# Patient Record
Sex: FEMALE | Race: WHITE | URBAN - METROPOLITAN AREA
[De-identification: names, ages, dates, MRNs, and addresses within clinical notes are randomized per-mention and may not be internally consistent; named-entity substitution may affect disease eponyms.]

---

## 2018-06-13 VITALS
DIASTOLIC BLOOD PRESSURE: 79 MMHG | WEIGHT: 117.95 LBS | TEMPERATURE: 97 F | SYSTOLIC BLOOD PRESSURE: 180 MMHG | OXYGEN SATURATION: 95 % | HEART RATE: 78 BPM | HEIGHT: 65 IN | RESPIRATION RATE: 16 BRPM

## 2018-06-13 NOTE — PATIENT PROFILE ADULT. - PSH
Elbow fracture  left  H/O laminectomy    History of appendectomy    History of lumbar fusion  and thoracic  History of lumbar fusion  failed  History of total right knee replacement  '13  Volvulus

## 2018-06-13 NOTE — PATIENT PROFILE ADULT. - PMH
Basal cell carcinoma    Colon obstruction Basal cell carcinoma    Cardiomyopathy    Colon obstruction    COPD (chronic obstructive pulmonary disease)    DVT (deep venous thrombosis) Basal cell carcinoma    Cardiomyopathy    Colon obstruction    COPD (chronic obstructive pulmonary disease)    DVT (deep venous thrombosis)  3 years ago, off xarelto

## 2018-06-14 ENCOUNTER — INPATIENT (INPATIENT)
Facility: HOSPITAL | Age: 73
LOS: 3 days | Discharge: HOME | DRG: 336 | End: 2018-06-18
Attending: SURGERY | Admitting: SURGERY
Payer: MEDICARE

## 2018-06-14 DIAGNOSIS — Z96.651 PRESENCE OF RIGHT ARTIFICIAL KNEE JOINT: Chronic | ICD-10-CM

## 2018-06-14 DIAGNOSIS — S42.409A UNSPECIFIED FRACTURE OF LOWER END OF UNSPECIFIED HUMERUS, INITIAL ENCOUNTER FOR CLOSED FRACTURE: Chronic | ICD-10-CM

## 2018-06-14 DIAGNOSIS — K56.609 UNSPECIFIED INTESTINAL OBSTRUCTION, UNSPECIFIED AS TO PARTIAL VERSUS COMPLETE OBSTRUCTION: ICD-10-CM

## 2018-06-14 DIAGNOSIS — K56.2 VOLVULUS: Chronic | ICD-10-CM

## 2018-06-14 DIAGNOSIS — Z98.1 ARTHRODESIS STATUS: Chronic | ICD-10-CM

## 2018-06-14 DIAGNOSIS — Z90.49 ACQUIRED ABSENCE OF OTHER SPECIFIED PARTS OF DIGESTIVE TRACT: Chronic | ICD-10-CM

## 2018-06-14 DIAGNOSIS — Z98.890 OTHER SPECIFIED POSTPROCEDURAL STATES: Chronic | ICD-10-CM

## 2018-06-14 LAB
APTT BLD: 25 SEC — LOW (ref 27.5–37.4)
BASOPHILS NFR BLD AUTO: 0.4 % — SIGNIFICANT CHANGE UP (ref 0–2)
EOSINOPHIL NFR BLD AUTO: 2.2 % — SIGNIFICANT CHANGE UP (ref 0–6)
GLUCOSE BLDC GLUCOMTR-MCNC: 124 MG/DL — HIGH (ref 70–99)
HCT VFR BLD CALC: 40.8 % — SIGNIFICANT CHANGE UP (ref 34.5–45)
HGB BLD-MCNC: 13.6 G/DL — SIGNIFICANT CHANGE UP (ref 11.5–15.5)
INR BLD: 0.96 — SIGNIFICANT CHANGE UP (ref 0.88–1.16)
LYMPHOCYTES # BLD AUTO: 21 % — SIGNIFICANT CHANGE UP (ref 13–44)
MCHC RBC-ENTMCNC: 33.3 G/DL — SIGNIFICANT CHANGE UP (ref 32–36)
MCHC RBC-ENTMCNC: 33.9 PG — SIGNIFICANT CHANGE UP (ref 27–34)
MCV RBC AUTO: 101.7 FL — HIGH (ref 80–100)
MONOCYTES NFR BLD AUTO: 16 % — HIGH (ref 2–14)
NEUTROPHILS NFR BLD AUTO: 60.4 % — SIGNIFICANT CHANGE UP (ref 43–77)
PLATELET # BLD AUTO: 136 K/UL — LOW (ref 150–400)
PROTHROM AB SERPL-ACNC: 10.6 SEC — SIGNIFICANT CHANGE UP (ref 9.8–12.7)
RBC # BLD: 4.01 M/UL — SIGNIFICANT CHANGE UP (ref 3.8–5.2)
RBC # FLD: 12.7 % — SIGNIFICANT CHANGE UP (ref 10.3–16.9)
WBC # BLD: 5.4 K/UL — SIGNIFICANT CHANGE UP (ref 3.8–10.5)
WBC # FLD AUTO: 5.4 K/UL — SIGNIFICANT CHANGE UP (ref 3.8–10.5)

## 2018-06-14 RX ORDER — ONDANSETRON 8 MG/1
4 TABLET, FILM COATED ORAL EVERY 6 HOURS
Qty: 0 | Refills: 0 | Status: DISCONTINUED | OUTPATIENT
Start: 2018-06-14 | End: 2018-06-18

## 2018-06-14 RX ORDER — KETOROLAC TROMETHAMINE 0.5 %
1 DROPS OPHTHALMIC (EYE)
Qty: 0 | Refills: 0 | Status: DISCONTINUED | OUTPATIENT
Start: 2018-06-14 | End: 2018-06-14

## 2018-06-14 RX ORDER — HEPARIN SODIUM 5000 [USP'U]/ML
5000 INJECTION INTRAVENOUS; SUBCUTANEOUS EVERY 8 HOURS
Qty: 0 | Refills: 0 | Status: DISCONTINUED | OUTPATIENT
Start: 2018-06-14 | End: 2018-06-16

## 2018-06-14 RX ORDER — HYDROMORPHONE HYDROCHLORIDE 2 MG/ML
0.5 INJECTION INTRAMUSCULAR; INTRAVENOUS; SUBCUTANEOUS EVERY 4 HOURS
Qty: 0 | Refills: 0 | Status: DISCONTINUED | OUTPATIENT
Start: 2018-06-14 | End: 2018-06-15

## 2018-06-14 RX ORDER — SODIUM CHLORIDE 9 MG/ML
1000 INJECTION, SOLUTION INTRAVENOUS
Qty: 0 | Refills: 0 | Status: DISCONTINUED | OUTPATIENT
Start: 2018-06-14 | End: 2018-06-15

## 2018-06-14 RX ORDER — IPRATROPIUM/ALBUTEROL SULFATE 18-103MCG
3 AEROSOL WITH ADAPTER (GRAM) INHALATION EVERY 6 HOURS
Qty: 0 | Refills: 0 | Status: DISCONTINUED | OUTPATIENT
Start: 2018-06-14 | End: 2018-06-18

## 2018-06-14 RX ORDER — ERYTHROMYCIN BASE 5 MG/GRAM
1 OINTMENT (GRAM) OPHTHALMIC (EYE)
Qty: 0 | Refills: 0 | Status: DISCONTINUED | OUTPATIENT
Start: 2018-06-14 | End: 2018-06-18

## 2018-06-14 RX ORDER — HYDROMORPHONE HYDROCHLORIDE 2 MG/ML
1 INJECTION INTRAMUSCULAR; INTRAVENOUS; SUBCUTANEOUS EVERY 4 HOURS
Qty: 0 | Refills: 0 | Status: DISCONTINUED | OUTPATIENT
Start: 2018-06-14 | End: 2018-06-15

## 2018-06-14 RX ORDER — BUPIVACAINE 13.3 MG/ML
20 INJECTION, SUSPENSION, LIPOSOMAL INFILTRATION ONCE
Qty: 0 | Refills: 0 | Status: DISCONTINUED | OUTPATIENT
Start: 2018-06-14 | End: 2018-06-18

## 2018-06-14 RX ADMIN — HYDROMORPHONE HYDROCHLORIDE 0.5 MILLIGRAM(S): 2 INJECTION INTRAMUSCULAR; INTRAVENOUS; SUBCUTANEOUS at 15:21

## 2018-06-14 RX ADMIN — HYDROMORPHONE HYDROCHLORIDE 1 MILLIGRAM(S): 2 INJECTION INTRAMUSCULAR; INTRAVENOUS; SUBCUTANEOUS at 22:03

## 2018-06-14 RX ADMIN — HYDROMORPHONE HYDROCHLORIDE 1 MILLIGRAM(S): 2 INJECTION INTRAMUSCULAR; INTRAVENOUS; SUBCUTANEOUS at 22:15

## 2018-06-14 RX ADMIN — Medication 1 DROP(S): at 15:18

## 2018-06-14 RX ADMIN — Medication 1 DROP(S): at 14:13

## 2018-06-14 RX ADMIN — HYDROMORPHONE HYDROCHLORIDE 0.5 MILLIGRAM(S): 2 INJECTION INTRAMUSCULAR; INTRAVENOUS; SUBCUTANEOUS at 15:22

## 2018-06-14 RX ADMIN — Medication 10 MILLIGRAM(S): at 22:00

## 2018-06-14 RX ADMIN — Medication 1 DROP(S): at 19:42

## 2018-06-14 RX ADMIN — HYDROMORPHONE HYDROCHLORIDE 1 MILLIGRAM(S): 2 INJECTION INTRAMUSCULAR; INTRAVENOUS; SUBCUTANEOUS at 15:42

## 2018-06-14 RX ADMIN — Medication 3 MILLILITER(S): at 17:07

## 2018-06-14 RX ADMIN — HEPARIN SODIUM 5000 UNIT(S): 5000 INJECTION INTRAVENOUS; SUBCUTANEOUS at 22:02

## 2018-06-14 RX ADMIN — Medication 1 APPLICATION(S): at 22:03

## 2018-06-14 RX ADMIN — HYDROMORPHONE HYDROCHLORIDE 1 MILLIGRAM(S): 2 INJECTION INTRAMUSCULAR; INTRAVENOUS; SUBCUTANEOUS at 15:29

## 2018-06-14 NOTE — PROGRESS NOTE ADULT - SUBJECTIVE AND OBJECTIVE BOX
Team 1 Surgery Post-Op Note, PCN:     Procedure: Laparoscopic lysis of adhesions    Surgeon: Aquilino    Subjective: Patient examined postoperatively. Pain adequately controlled. Denies n/v/CP/SOB.      Vital Signs Last 24 Hrs  T(C): 36.9 (14 Jun 2018 13:31), Max: 36.9 (14 Jun 2018 13:31)  T(F): 98.4 (14 Jun 2018 13:31), Max: 98.4 (14 Jun 2018 13:31)  HR: 76 (14 Jun 2018 14:01) (76 - 82)  BP: 109/55 (14 Jun 2018 14:01) (109/55 - 180/79)  BP(mean): 86 (14 Jun 2018 14:01) (86 - 105)  RR: 14 (14 Jun 2018 14:01) (14 - 18)  SpO2: 98% (14 Jun 2018 14:01) (95% - 100%)    Physical Exam:  General: NAD, resting comfortably in bed  Pulmonary: Nonlabored breathing, no respiratory distress  Abdominal: softly distended, appropriately tender, prior well-healed incisions, incisions clean/dry/intact      LABS:                        13.6   5.4   )-----------( 136      ( 14 Jun 2018 08:13 )             40.8           PT/INR - ( 14 Jun 2018 08:14 )   PT: 10.6 sec;   INR: 0.96          PTT - ( 14 Jun 2018 08:14 )  PTT:25.0 sec  CAPILLARY BLOOD GLUCOSE      POCT Blood Glucose.: 124 mg/dL (14 Jun 2018 07:58)        ABO Interpretation: O (06-14 @ 10:31)        Radiology and Additional Studies:    Assessment: 72y Female s/p laparoscopy with lysis of adhesions    Plan:  Pain/nausea control PRN  NGT, NPO, IVF  AM labs  Bartlett to be dc'd at midnight  WA protocol  Opthalmology consult

## 2018-06-14 NOTE — H&P ADULT - HISTORY OF PRESENT ILLNESS
72F with extensive surgical history, here with chronic diarrhea, abdominal pain, and abdominal distension, found on CT scan to have collapsed descending colon with dilated proximal large bowel. As a result, pt came for diagnostic laparoscopy, lysis of adhesions, and mobilization of splenic flexure. Of note, pt had a recent colonoscopy on 3/20/2018 that was unrevealing. Pt reports that she has diarrhea daily. NO f/c/n/v.

## 2018-06-14 NOTE — H&P ADULT - NSHPPHYSICALEXAM_GEN_ALL_CORE
Gen: NAD  CV: RRR  Pulm: Regular nonlabored respirations  Abd: Softly distended, mild tenderness in upper abdomen

## 2018-06-14 NOTE — BRIEF OPERATIVE NOTE - OPERATION/FINDINGS
Preop Dx: Chronic, partial colonic obstruction  Postop Dx: Same as above  Procedure: Laparoscopic lysis of adhesions and mobilization of splenic flexure  Findings: Abdomen entered in LUQ under direct vision. Additional ports placed. Adhesions lysed. Falciform ligament taken down to place further trocars. Diagnostic laparoscopy noted for dilated transverse colon and splenic flexure with collapsed descending colon. White line of [place] mobilized from mid descending colon up to the splenic flexure. Retrosplenic adhesions lysed. Ultimately, descending colon filled with material and began to dilate. Hemostasis obtained. NGT placed under direct vision to decompress the stomach.

## 2018-06-14 NOTE — H&P ADULT - PMH
Basal cell carcinoma    Cardiomyopathy    Colon obstruction    COPD (chronic obstructive pulmonary disease)    DVT (deep venous thrombosis)  3 years ago, off xarelto

## 2018-06-14 NOTE — BRIEF OPERATIVE NOTE - PROCEDURE
<<-----Click on this checkbox to enter Procedure Laparoscopic lysis of adhesions  06/14/2018  with mobilization of splenic flexure  Active  [id number]

## 2018-06-14 NOTE — H&P ADULT - PROBLEM SELECTOR PLAN 1
-Admit to Dr. Rolle - Surgery Team 4 - Telemetry  -NPO/NGT/IVF  -Pain/nausea control  -CIWA protocol as pt has extensive EtOH history  -SQH  -D/W Dr. Rolle

## 2018-06-14 NOTE — H&P ADULT - ASSESSMENT
72F with extensive surgical history, found to have likely chronic partial obstruction of her colon at the splenic flexure s/p diagnostic laparoscopy, lysis of adhesions, and mobilization of the splenic flexure

## 2018-06-15 LAB
ANION GAP SERPL CALC-SCNC: 12 MMOL/L — SIGNIFICANT CHANGE UP (ref 5–17)
BUN SERPL-MCNC: 3 MG/DL — LOW (ref 7–23)
CALCIUM SERPL-MCNC: 8.4 MG/DL — SIGNIFICANT CHANGE UP (ref 8.4–10.5)
CHLORIDE SERPL-SCNC: 101 MMOL/L — SIGNIFICANT CHANGE UP (ref 96–108)
CO2 SERPL-SCNC: 26 MMOL/L — SIGNIFICANT CHANGE UP (ref 22–31)
CREAT SERPL-MCNC: 0.6 MG/DL — SIGNIFICANT CHANGE UP (ref 0.5–1.3)
GLUCOSE SERPL-MCNC: 139 MG/DL — HIGH (ref 70–99)
HCT VFR BLD CALC: 35.5 % — SIGNIFICANT CHANGE UP (ref 34.5–45)
HGB BLD-MCNC: 11.8 G/DL — SIGNIFICANT CHANGE UP (ref 11.5–15.5)
MAGNESIUM SERPL-MCNC: 1.8 MG/DL — SIGNIFICANT CHANGE UP (ref 1.6–2.6)
MCHC RBC-ENTMCNC: 33.2 G/DL — SIGNIFICANT CHANGE UP (ref 32–36)
MCHC RBC-ENTMCNC: 34 PG — SIGNIFICANT CHANGE UP (ref 27–34)
MCV RBC AUTO: 102.3 FL — HIGH (ref 80–100)
PHOSPHATE SERPL-MCNC: 2.7 MG/DL — SIGNIFICANT CHANGE UP (ref 2.5–4.5)
PLATELET # BLD AUTO: 117 K/UL — LOW (ref 150–400)
POTASSIUM SERPL-MCNC: 4.8 MMOL/L — SIGNIFICANT CHANGE UP (ref 3.5–5.3)
POTASSIUM SERPL-SCNC: 4.8 MMOL/L — SIGNIFICANT CHANGE UP (ref 3.5–5.3)
RBC # BLD: 3.47 M/UL — LOW (ref 3.8–5.2)
RBC # FLD: 12.5 % — SIGNIFICANT CHANGE UP (ref 10.3–16.9)
SODIUM SERPL-SCNC: 139 MMOL/L — SIGNIFICANT CHANGE UP (ref 135–145)
WBC # BLD: 7.1 K/UL — SIGNIFICANT CHANGE UP (ref 3.8–10.5)
WBC # FLD AUTO: 7.1 K/UL — SIGNIFICANT CHANGE UP (ref 3.8–10.5)

## 2018-06-15 PROCEDURE — 74019 RADEX ABDOMEN 2 VIEWS: CPT | Mod: 26

## 2018-06-15 RX ORDER — OXYCODONE HYDROCHLORIDE 5 MG/1
5 TABLET ORAL EVERY 4 HOURS
Qty: 0 | Refills: 0 | Status: DISCONTINUED | OUTPATIENT
Start: 2018-06-15 | End: 2018-06-18

## 2018-06-15 RX ORDER — FOLIC ACID 0.8 MG
1 TABLET ORAL DAILY
Qty: 0 | Refills: 0 | Status: DISCONTINUED | OUTPATIENT
Start: 2018-06-15 | End: 2018-06-18

## 2018-06-15 RX ORDER — UMECLIDINIUM BROMIDE AND VILANTEROL TRIFENATATE 62.5; 25 UG/1; UG/1
2 POWDER RESPIRATORY (INHALATION) DAILY
Qty: 0 | Refills: 0 | Status: DISCONTINUED | OUTPATIENT
Start: 2018-06-15 | End: 2018-06-18

## 2018-06-15 RX ORDER — OXYCODONE HYDROCHLORIDE 5 MG/1
10 TABLET ORAL EVERY 4 HOURS
Qty: 0 | Refills: 0 | Status: DISCONTINUED | OUTPATIENT
Start: 2018-06-15 | End: 2018-06-18

## 2018-06-15 RX ORDER — MAGNESIUM SULFATE 500 MG/ML
1 VIAL (ML) INJECTION ONCE
Qty: 0 | Refills: 0 | Status: COMPLETED | OUTPATIENT
Start: 2018-06-15 | End: 2018-06-15

## 2018-06-15 RX ORDER — PANTOPRAZOLE SODIUM 20 MG/1
40 TABLET, DELAYED RELEASE ORAL
Qty: 0 | Refills: 0 | Status: DISCONTINUED | OUTPATIENT
Start: 2018-06-15 | End: 2018-06-18

## 2018-06-15 RX ORDER — BACLOFEN 100 %
10 POWDER (GRAM) MISCELLANEOUS AT BEDTIME
Qty: 0 | Refills: 0 | Status: DISCONTINUED | OUTPATIENT
Start: 2018-06-15 | End: 2018-06-18

## 2018-06-15 RX ORDER — MULTIVIT-MIN/FERROUS GLUCONATE 9 MG/15 ML
1 LIQUID (ML) ORAL DAILY
Qty: 0 | Refills: 0 | Status: DISCONTINUED | OUTPATIENT
Start: 2018-06-15 | End: 2018-06-18

## 2018-06-15 RX ORDER — ASPIRIN/CALCIUM CARB/MAGNESIUM 324 MG
81 TABLET ORAL DAILY
Qty: 0 | Refills: 0 | Status: DISCONTINUED | OUTPATIENT
Start: 2018-06-15 | End: 2018-06-18

## 2018-06-15 RX ORDER — SODIUM CHLORIDE 9 MG/ML
1000 INJECTION, SOLUTION INTRAVENOUS
Qty: 0 | Refills: 0 | Status: DISCONTINUED | OUTPATIENT
Start: 2018-06-15 | End: 2018-06-15

## 2018-06-15 RX ORDER — HYDROMORPHONE HYDROCHLORIDE 2 MG/ML
0.5 INJECTION INTRAMUSCULAR; INTRAVENOUS; SUBCUTANEOUS ONCE
Qty: 0 | Refills: 0 | Status: DISCONTINUED | OUTPATIENT
Start: 2018-06-15 | End: 2018-06-15

## 2018-06-15 RX ORDER — THIAMINE MONONITRATE (VIT B1) 100 MG
100 TABLET ORAL DAILY
Qty: 0 | Refills: 0 | Status: DISCONTINUED | OUTPATIENT
Start: 2018-06-15 | End: 2018-06-18

## 2018-06-15 RX ORDER — CARVEDILOL PHOSPHATE 80 MG/1
3.12 CAPSULE, EXTENDED RELEASE ORAL EVERY 12 HOURS
Qty: 0 | Refills: 0 | Status: DISCONTINUED | OUTPATIENT
Start: 2018-06-15 | End: 2018-06-18

## 2018-06-15 RX ORDER — ATORVASTATIN CALCIUM 80 MG/1
10 TABLET, FILM COATED ORAL AT BEDTIME
Qty: 0 | Refills: 0 | Status: DISCONTINUED | OUTPATIENT
Start: 2018-06-15 | End: 2018-06-18

## 2018-06-15 RX ORDER — METOPROLOL TARTRATE 50 MG
10 TABLET ORAL ONCE
Qty: 0 | Refills: 0 | Status: COMPLETED | OUTPATIENT
Start: 2018-06-15 | End: 2018-06-15

## 2018-06-15 RX ADMIN — Medication 1 APPLICATION(S): at 11:14

## 2018-06-15 RX ADMIN — PANTOPRAZOLE SODIUM 40 MILLIGRAM(S): 20 TABLET, DELAYED RELEASE ORAL at 11:06

## 2018-06-15 RX ADMIN — HEPARIN SODIUM 5000 UNIT(S): 5000 INJECTION INTRAVENOUS; SUBCUTANEOUS at 06:10

## 2018-06-15 RX ADMIN — HYDROMORPHONE HYDROCHLORIDE 1 MILLIGRAM(S): 2 INJECTION INTRAMUSCULAR; INTRAVENOUS; SUBCUTANEOUS at 02:30

## 2018-06-15 RX ADMIN — HYDROMORPHONE HYDROCHLORIDE 1 MILLIGRAM(S): 2 INJECTION INTRAMUSCULAR; INTRAVENOUS; SUBCUTANEOUS at 02:03

## 2018-06-15 RX ADMIN — HYDROMORPHONE HYDROCHLORIDE 1 MILLIGRAM(S): 2 INJECTION INTRAMUSCULAR; INTRAVENOUS; SUBCUTANEOUS at 11:22

## 2018-06-15 RX ADMIN — Medication 81 MILLIGRAM(S): at 11:06

## 2018-06-15 RX ADMIN — Medication 10 MILLIGRAM(S): at 22:44

## 2018-06-15 RX ADMIN — Medication 10 MILLIGRAM(S): at 23:55

## 2018-06-15 RX ADMIN — Medication 1 APPLICATION(S): at 06:10

## 2018-06-15 RX ADMIN — ATORVASTATIN CALCIUM 10 MILLIGRAM(S): 80 TABLET, FILM COATED ORAL at 22:44

## 2018-06-15 RX ADMIN — Medication 1 MILLIGRAM(S): at 11:06

## 2018-06-15 RX ADMIN — Medication 62.5 MILLIMOLE(S): at 14:35

## 2018-06-15 RX ADMIN — HYDROMORPHONE HYDROCHLORIDE 1 MILLIGRAM(S): 2 INJECTION INTRAMUSCULAR; INTRAVENOUS; SUBCUTANEOUS at 06:30

## 2018-06-15 RX ADMIN — Medication 1 APPLICATION(S): at 17:44

## 2018-06-15 RX ADMIN — HYDROMORPHONE HYDROCHLORIDE 1 MILLIGRAM(S): 2 INJECTION INTRAMUSCULAR; INTRAVENOUS; SUBCUTANEOUS at 11:06

## 2018-06-15 RX ADMIN — HYDROMORPHONE HYDROCHLORIDE 1 MILLIGRAM(S): 2 INJECTION INTRAMUSCULAR; INTRAVENOUS; SUBCUTANEOUS at 06:03

## 2018-06-15 RX ADMIN — CARVEDILOL PHOSPHATE 3.12 MILLIGRAM(S): 80 CAPSULE, EXTENDED RELEASE ORAL at 17:42

## 2018-06-15 RX ADMIN — Medication 100 MILLIGRAM(S): at 11:06

## 2018-06-15 RX ADMIN — UMECLIDINIUM BROMIDE AND VILANTEROL TRIFENATATE 2 PUFF(S): 62.5; 25 POWDER RESPIRATORY (INHALATION) at 17:19

## 2018-06-15 RX ADMIN — HEPARIN SODIUM 5000 UNIT(S): 5000 INJECTION INTRAVENOUS; SUBCUTANEOUS at 14:35

## 2018-06-15 RX ADMIN — Medication 100 GRAM(S): at 11:06

## 2018-06-15 NOTE — PROGRESS NOTE ADULT - ASSESSMENT
72F extensive surgical hx with chronic partial obstruction of colon at splenic flexure s/p diagnostic laparoscopy, lysis of adhesions, mobilization of splenic flexure, POD1.    Pain and nausea control with Dilaudid   NPO/IVF  Keep duonebs PRN (for COPD)  Pending trial of void  DVT prophylaxis (SCD and SQH)  AM labs

## 2018-06-15 NOTE — PROGRESS NOTE ADULT - SUBJECTIVE AND OBJECTIVE BOX
STATUS POST:  Diagnostic laparoscopy, lysis of adhesions, mobilization of splenic flexure, POD1     SUBJECTIVE: Patient seen and examined bedside by chief resident. Dr. Middleton (\[facility]\"") erythromycin ointment 4xday for 5 days. Patient self-removed NGT. Bartlett d/c pending TOV, Complain of mild-moderate abdominal pain, no nausea/vomiting but feeling bloated. Requested to eat however no flatus/ BM yet.    heparin  Injectable 5000 Unit(s) SubCutaneous every 8 hours    Vital Signs Last 24 Hrs  T(C): 36.1 (15 Clifton 2018 05:21), Max: 36.9 (14 Jun 2018 13:31)  T(F): 96.9 (15 Clifton 2018 05:21), Max: 98.4 (14 Jun 2018 13:31)  HR: 82 (15 Clifton 2018 05:35) (70 - 86)  BP: 162/77 (15 Clifton 2018 05:35) (106/51 - 163/98)  BP(mean): 110 (15 Clifton 2018 05:35) (72 - 114)  RR: 16 (15 Clifton 2018 05:35) (11 - 25)  SpO2: 97% (15 Clifton 2018 05:35) (96% - 100%)  I&O's Detail    14 Jun 2018 07:01  -  15 Clifton 2018 07:00  --------------------------------------------------------  IN:    lactated ringers.: 300 mL  Total IN: 300 mL    OUT:    Indwelling Catheter - Urethral: 255 mL    Ureteral Catheter: 275 mL  Total OUT: 530 mL    Total NET: -230 mL        General: NAD, resting comfortably in bed  C/V: S1S2 heard, no murmurs  Pulm: Nonlabored breathing, no respiratory distress  Abd: softly distended, tender over the midline but appropriate, incision C/D/I  Extrem: WWP, no edema, SCDs in place        LABS:                        11.8   7.1   )-----------( 117      ( 15 Clifton 2018 06:15 )             35.5     06-15    139  |  101  |  3<L>  ----------------------------<  139<H>  4.8   |  26  |  0.60    Ca    8.4      15 Clifton 2018 06:15  Phos  2.7     06-15  Mg     1.8     06-15      PT/INR - ( 14 Jun 2018 08:14 )   PT: 10.6 sec;   INR: 0.96          PTT - ( 14 Jun 2018 08:14 )  PTT:25.0 sec      RADIOLOGY & ADDITIONAL STUDIES:

## 2018-06-15 NOTE — CHART NOTE - NSCHARTNOTEFT_GEN_A_CORE
Paged at 9:00 PM by nurse regarding patient's behavior towards nursing staff. Patient insists that she be on a "heart monitor" otherwise she will leave AMA. Patient seen and examined bedside. Patient agrees to stay in the hospital if she is moved to a private telemetry bed on the same floor. After long discussion, patient is granted her wish with permission from nursing staff and she is roomed in a telemetry private room on [address].    Soon after, paged by nursing again that patient is threatening to leave AMA. Patient visited again, seen and examined bedside. Discussed and educated patient regarding the importance of being monitored in the hospital, especially since she is only postoperative day 1. Patient continuing to complain that the size of the room is too small. After further discussion, patient conceded to staying overnight.

## 2018-06-15 NOTE — PROGRESS NOTE ADULT - SUBJECTIVE AND OBJECTIVE BOX
Daily     Daily   Vital Signs Last 24 Hrs  T(C): 36.1 (15 Clifton 2018 09:00), Max: 36.9 (14 Jun 2018 13:31)  T(F): 96.9 (15 Clifton 2018 09:00), Max: 98.4 (14 Jun 2018 13:31)  HR: 86 (15 Clifton 2018 08:46) (70 - 86)  BP: 149/84 (15 Clifton 2018 08:46) (106/51 - 163/98)  BP(mean): 108 (15 Clifton 2018 08:46) (72 - 114)  RR: 16 (15 Clifton 2018 08:46) (11 - 25)  SpO2: 97% (15 Clifton 2018 08:46) (96% - 100%)  Allergies    latex (Flushing; Rash)  No Known Drug Allergies    Intolerances      Labs:                        11.8   7.1   )-----------( 117      ( 15 Clifton 2018 06:15 )             35.5     PT/INR - ( 14 Jun 2018 08:14 )   PT: 10.6 sec;   INR: 0.96          PTT - ( 14 Jun 2018 08:14 )  PTT:25.0 sec  06-15    139  |  101  |  3<L>  ----------------------------<  139<H>  4.8   |  26  |  0.60    Ca    8.4      15 Clifton 2018 06:15  Phos  2.7     06-15  Mg     1.8     06-15      I&O's Summary    14 Jun 2018 07:01  -  15 Clifton 2018 07:00  --------------------------------------------------------  IN: 600 mL / OUT: 530 mL / NET: 70 mL    6/15/1HV 20 min 10:50 am  I had a few telephone conversations since OR.  Main complaint was swollen and uncomfortable left eye secondary to mastasol dripping when anaesthesia placed ng tube which was necessary because of extensive gastric distension noted at end of surgery. To day there is no conjunctivitis or swelling and eye is wide open. Rx with saline and neomycin drops rec by tel when an eye consult was requested yesterday.  LUQ pain is consistent with extensive lateral nd posterior dissection including mobilization of splenic flexure and mobilization of spleen to free high splenic flexure kinking in multiple dimensions under the spleen.  Dilaudid for pain. Pat used vicodin for back pain related to extensive spine fixation by AP approach and revision of two previous procedures. Adhesions to transverse abdominal incion was lysed. Cecopexy that was Rx for cecal volvulus was left intact.  Patient is walking and tolerating clear fluids. Colon has not yet decompressed. Gas and of proximal dilated colon were seen filling distal colon which had been collapsed.  No other pathology seen. Liver niormal despite 1 bottle of wine /day. All of above discussed with patient.   Hgb slightly decreased consistent with RL volume expansion. Platelets stable.  DVT prophylaxis-intraop and postop subcut heparin, TEDs , SCD. ASA to restart today. Pt has an IVC filter.  Supplementary effect explained to nurses.  Pt encouraged to walk, take tea and chew gum to stimulate colon function.  Prep meds restrated. Rx discussed with residents.  Overall impression: good progres, stable,  LOS- projected 2-3 days.  MARIA GUADALUPE Rolle MD

## 2018-06-16 LAB
ANION GAP SERPL CALC-SCNC: 11 MMOL/L — SIGNIFICANT CHANGE UP (ref 5–17)
ANION GAP SERPL CALC-SCNC: 14 MMOL/L — SIGNIFICANT CHANGE UP (ref 5–17)
BUN SERPL-MCNC: 2 MG/DL — LOW (ref 7–23)
BUN SERPL-MCNC: 2 MG/DL — LOW (ref 7–23)
CALCIUM SERPL-MCNC: 8.6 MG/DL — SIGNIFICANT CHANGE UP (ref 8.4–10.5)
CALCIUM SERPL-MCNC: 8.9 MG/DL — SIGNIFICANT CHANGE UP (ref 8.4–10.5)
CHLORIDE SERPL-SCNC: 103 MMOL/L — SIGNIFICANT CHANGE UP (ref 96–108)
CHLORIDE SERPL-SCNC: 97 MMOL/L — SIGNIFICANT CHANGE UP (ref 96–108)
CO2 SERPL-SCNC: 25 MMOL/L — SIGNIFICANT CHANGE UP (ref 22–31)
CO2 SERPL-SCNC: 26 MMOL/L — SIGNIFICANT CHANGE UP (ref 22–31)
CREAT SERPL-MCNC: 0.5 MG/DL — SIGNIFICANT CHANGE UP (ref 0.5–1.3)
CREAT SERPL-MCNC: 0.53 MG/DL — SIGNIFICANT CHANGE UP (ref 0.5–1.3)
GLUCOSE SERPL-MCNC: 105 MG/DL — HIGH (ref 70–99)
GLUCOSE SERPL-MCNC: 121 MG/DL — HIGH (ref 70–99)
HCT VFR BLD CALC: 36.5 % — SIGNIFICANT CHANGE UP (ref 34.5–45)
HGB BLD-MCNC: 12.1 G/DL — SIGNIFICANT CHANGE UP (ref 11.5–15.5)
MAGNESIUM SERPL-MCNC: 1.7 MG/DL — SIGNIFICANT CHANGE UP (ref 1.6–2.6)
MCHC RBC-ENTMCNC: 33.2 G/DL — SIGNIFICANT CHANGE UP (ref 32–36)
MCHC RBC-ENTMCNC: 33.9 PG — SIGNIFICANT CHANGE UP (ref 27–34)
MCV RBC AUTO: 102.2 FL — HIGH (ref 80–100)
PHOSPHATE SERPL-MCNC: 3 MG/DL — SIGNIFICANT CHANGE UP (ref 2.5–4.5)
PLATELET # BLD AUTO: 120 K/UL — LOW (ref 150–400)
POTASSIUM SERPL-MCNC: 3.1 MMOL/L — LOW (ref 3.5–5.3)
POTASSIUM SERPL-MCNC: 3.3 MMOL/L — LOW (ref 3.5–5.3)
POTASSIUM SERPL-SCNC: 3.1 MMOL/L — LOW (ref 3.5–5.3)
POTASSIUM SERPL-SCNC: 3.3 MMOL/L — LOW (ref 3.5–5.3)
RBC # BLD: 3.57 M/UL — LOW (ref 3.8–5.2)
RBC # FLD: 12.7 % — SIGNIFICANT CHANGE UP (ref 10.3–16.9)
SODIUM SERPL-SCNC: 137 MMOL/L — SIGNIFICANT CHANGE UP (ref 135–145)
SODIUM SERPL-SCNC: 139 MMOL/L — SIGNIFICANT CHANGE UP (ref 135–145)
WBC # BLD: 6.4 K/UL — SIGNIFICANT CHANGE UP (ref 3.8–10.5)
WBC # FLD AUTO: 6.4 K/UL — SIGNIFICANT CHANGE UP (ref 3.8–10.5)

## 2018-06-16 RX ORDER — MAGNESIUM SULFATE 500 MG/ML
2 VIAL (ML) INJECTION ONCE
Qty: 0 | Refills: 0 | Status: COMPLETED | OUTPATIENT
Start: 2018-06-16 | End: 2018-06-16

## 2018-06-16 RX ORDER — ESTROGENS, CONJUGATED 0.625 MG
0.3 TABLET ORAL DAILY
Qty: 0 | Refills: 0 | Status: DISCONTINUED | OUTPATIENT
Start: 2018-06-16 | End: 2018-06-18

## 2018-06-16 RX ORDER — POTASSIUM CHLORIDE 20 MEQ
20 PACKET (EA) ORAL ONCE
Qty: 0 | Refills: 0 | Status: COMPLETED | OUTPATIENT
Start: 2018-06-16 | End: 2018-06-16

## 2018-06-16 RX ORDER — MEDROXYPROGESTERONE ACETATE 150 MG/ML
2.5 INJECTION, SUSPENSION, EXTENDED RELEASE INTRAMUSCULAR DAILY
Qty: 0 | Refills: 0 | Status: DISCONTINUED | OUTPATIENT
Start: 2018-06-16 | End: 2018-06-18

## 2018-06-16 RX ORDER — POTASSIUM CHLORIDE 20 MEQ
40 PACKET (EA) ORAL ONCE
Qty: 0 | Refills: 0 | Status: COMPLETED | OUTPATIENT
Start: 2018-06-16 | End: 2018-06-16

## 2018-06-16 RX ORDER — METOPROLOL TARTRATE 50 MG
10 TABLET ORAL ONCE
Qty: 0 | Refills: 0 | Status: COMPLETED | OUTPATIENT
Start: 2018-06-16 | End: 2018-06-16

## 2018-06-16 RX ORDER — METOPROLOL TARTRATE 50 MG
5 TABLET ORAL ONCE
Qty: 0 | Refills: 0 | Status: COMPLETED | OUTPATIENT
Start: 2018-06-16 | End: 2018-06-16

## 2018-06-16 RX ORDER — POTASSIUM CHLORIDE 20 MEQ
10 PACKET (EA) ORAL
Qty: 0 | Refills: 0 | Status: COMPLETED | OUTPATIENT
Start: 2018-06-16 | End: 2018-06-16

## 2018-06-16 RX ADMIN — Medication 100 MILLIGRAM(S): at 12:01

## 2018-06-16 RX ADMIN — PANTOPRAZOLE SODIUM 40 MILLIGRAM(S): 20 TABLET, DELAYED RELEASE ORAL at 06:25

## 2018-06-16 RX ADMIN — Medication 10 MILLIGRAM(S): at 21:33

## 2018-06-16 RX ADMIN — Medication 40 MILLIEQUIVALENT(S): at 21:33

## 2018-06-16 RX ADMIN — Medication 1 DROP(S): at 21:33

## 2018-06-16 RX ADMIN — OXYCODONE HYDROCHLORIDE 10 MILLIGRAM(S): 5 TABLET ORAL at 10:26

## 2018-06-16 RX ADMIN — Medication 100 MILLIEQUIVALENT(S): at 12:01

## 2018-06-16 RX ADMIN — CARVEDILOL PHOSPHATE 3.12 MILLIGRAM(S): 80 CAPSULE, EXTENDED RELEASE ORAL at 17:53

## 2018-06-16 RX ADMIN — Medication 1 APPLICATION(S): at 12:02

## 2018-06-16 RX ADMIN — Medication 0.3 MILLIGRAM(S): at 21:34

## 2018-06-16 RX ADMIN — Medication 1 APPLICATION(S): at 06:25

## 2018-06-16 RX ADMIN — Medication 81 MILLIGRAM(S): at 12:01

## 2018-06-16 RX ADMIN — OXYCODONE HYDROCHLORIDE 10 MILLIGRAM(S): 5 TABLET ORAL at 11:00

## 2018-06-16 RX ADMIN — Medication 1 MILLIGRAM(S): at 12:02

## 2018-06-16 RX ADMIN — Medication 1 APPLICATION(S): at 16:29

## 2018-06-16 RX ADMIN — CARVEDILOL PHOSPHATE 3.12 MILLIGRAM(S): 80 CAPSULE, EXTENDED RELEASE ORAL at 06:25

## 2018-06-16 RX ADMIN — MEDROXYPROGESTERONE ACETATE 2.5 MILLIGRAM(S): 150 INJECTION, SUSPENSION, EXTENDED RELEASE INTRAMUSCULAR at 21:33

## 2018-06-16 RX ADMIN — Medication 50 GRAM(S): at 12:00

## 2018-06-16 RX ADMIN — Medication 1 APPLICATION(S): at 21:34

## 2018-06-16 RX ADMIN — Medication 20 MILLIEQUIVALENT(S): at 12:01

## 2018-06-16 RX ADMIN — ATORVASTATIN CALCIUM 10 MILLIGRAM(S): 80 TABLET, FILM COATED ORAL at 21:33

## 2018-06-16 RX ADMIN — Medication 100 MILLIEQUIVALENT(S): at 16:03

## 2018-06-16 RX ADMIN — Medication 100 MILLIEQUIVALENT(S): at 10:00

## 2018-06-16 RX ADMIN — Medication 5 MILLIGRAM(S): at 03:17

## 2018-06-16 RX ADMIN — UMECLIDINIUM BROMIDE AND VILANTEROL TRIFENATATE 2 PUFF(S): 62.5; 25 POWDER RESPIRATORY (INHALATION) at 12:04

## 2018-06-16 RX ADMIN — OXYCODONE HYDROCHLORIDE 10 MILLIGRAM(S): 5 TABLET ORAL at 23:35

## 2018-06-16 RX ADMIN — OXYCODONE HYDROCHLORIDE 10 MILLIGRAM(S): 5 TABLET ORAL at 19:26

## 2018-06-16 RX ADMIN — Medication 1 TABLET(S): at 12:02

## 2018-06-16 RX ADMIN — Medication 10 MILLIGRAM(S): at 00:11

## 2018-06-16 NOTE — PROGRESS NOTE ADULT - ASSESSMENT
72F extensive surgical hx with chronic partial obstruction of colon at splenic flexure s/p diagnostic laparoscopy, lysis of adhesions, mobilization of splenic flexure, passing brownish stool    Pain and nausea control with Dilaudid   CLD  Keep duonebs PRN (for COPD)  DVT prophylaxis (SCD and SQH)  AM labs   Folic acid and thiamine daily  SCD, no need SQH  cont home meds

## 2018-06-16 NOTE — PROVIDER CONTACT NOTE (OTHER) - ASSESSMENT
CIWA protocol was ordered on pt. However, quality management ordered to hold off on using CIWA for now. Pt does not show signs of alcohol withdrawal at this time.

## 2018-06-16 NOTE — PROVIDER CONTACT NOTE (OTHER) - ASSESSMENT
Pt refused 6AM dose of heparin injection. Pt stated "I'm going to be started on aspirin only today". Informed pt that she's ordered for aspirin at 12PM and reminded her that she has history of DVT. Pt responded with understanding but still refused.

## 2018-06-16 NOTE — PROGRESS NOTE ADULT - ASSESSMENT
1645  Patinet examined. Looks better, Distended but less and with increased tone. Bowel sound normal. AXR report filling of left colon. Labs nomral exccept hypokalemia. Cardiac rhythm normal today.   She notes some vaginal blood. Possible fom short placement.But she has been on Prempor since menopause which has noty been given in this perioperative and bowel prep time. Will restart. Increase diet to full fluids. I spoke to intern who didn't seem to understand what surgery was performed or why. Resident reported to me this AM. Mary Sanches contacted.  Plan D/C Monday if elctrolytes and diet can be stabilized.  ADH

## 2018-06-16 NOTE — PROGRESS NOTE ADULT - SUBJECTIVE AND OBJECTIVE BOX
ovn: tolerating CLD, no n/v. Eye significantly improved per patient -- no conjunctiva.   6/15 : CLD, passed TOV, AXR w/ nonobstructing gas pattern, optho consulted but did not see patient however patients eye pain is resolving on current regimen      72F extensive surgical hx with chronic partial obstruction of colon at splenic flexure s/p diagnostic laparoscopy, lysis of adhesions, mobilization of splenic flexure    Pain and nausea control with Dilaudid   CLD  Keep duonebs PRN (for COPD)  DVT prophylaxis (SCD and SQH)  AM labs   Folic acid and thiamine daily ovn: tolerating CLD, no n/v. Eye significantly improved per patient -- no conjunctiva.   6/15 : CLD, passed TOV, AXR w/ nonobstructing gas pattern, optho consulted but did not see patient however patients eye pain is resolving on current regimen       SUBJECTIVE: Patient seen and examined bedside by chief resident. No nausea/vomiting, eye feel slightly better, tolerating CLD. no abd pain    aspirin enteric coated 81 milliGRAM(s) Oral daily  carvedilol 3.125 milliGRAM(s) Oral every 12 hours  heparin  Injectable 5000 Unit(s) SubCutaneous every 8 hours      Vital Signs Last 24 Hrs  T(C): 37.3 (16 Jun 2018 05:11), Max: 37.3 (16 Jun 2018 05:11)  T(F): 99.2 (16 Jun 2018 05:11), Max: 99.2 (16 Jun 2018 05:11)  HR: 84 (16 Jun 2018 06:33) (84 - 115)  BP: 147/65 (16 Jun 2018 06:33) (124/63 - 164/79)  BP(mean): 95 (15 Clifton 2018 17:45) (88 - 95)  RR: 16 (16 Jun 2018 06:33) (16 - 18)  SpO2: 96% (16 Jun 2018 06:33) (91% - 98%)  I&O's Detail    15 Clifton 2018 07:01  -  16 Jun 2018 07:00  --------------------------------------------------------  IN:    lactated ringers.: 100 mL  Total IN: 100 mL    OUT:    Voided: 850 mL  Total OUT: 850 mL    Total NET: -750 mL          General: NAD, resting comfortably in bed  C/V: NSR  Pulm: Nonlabored breathing, no respiratory distress  Abd: soft,mildly distended, non tender, incision C/D/I  Extrem: WWP, no edema, SCDs in place        LABS:                        12.1   6.4   )-----------( 120      ( 16 Jun 2018 05:59 )             36.5     06-16    139  |  103  |  2<L>  ----------------------------<  121<H>  3.1<L>   |  25  |  0.53    Ca    8.6      16 Jun 2018 05:58  Phos  3.0     06-16  Mg     1.7     06-16            RADIOLOGY & ADDITIONAL STUDIES: ovn: tolerating CLD, no n/v. Eye significantly improved per patient -- no conjunctiva.   6/15 : CLD, passed TOV, AXR w/ nonobstructing gas pattern, optho consulted but did not see patient however patients eye pain is resolving on current regimen       SUBJECTIVE: Patient seen and examined bedside by chief resident. No nausea/vomiting, eye feel slightly better, tolerating CLD. no abd pain    aspirin enteric coated 81 milliGRAM(s) Oral daily  carvedilol 3.125 milliGRAM(s) Oral every 12 hours  heparin  Injectable 5000 Unit(s) SubCutaneous every 8 hours      Vital Signs Last 24 Hrs  T(C): 37.3 (16 Jun 2018 05:11), Max: 37.3 (16 Jun 2018 05:11)  T(F): 99.2 (16 Jun 2018 05:11), Max: 99.2 (16 Jun 2018 05:11)  HR: 84 (16 Jun 2018 06:33) (84 - 115)  BP: 147/65 (16 Jun 2018 06:33) (124/63 - 164/79)  BP(mean): 95 (15 Clifton 2018 17:45) (88 - 95)  RR: 16 (16 Jun 2018 06:33) (16 - 18)  SpO2: 96% (16 Jun 2018 06:33) (91% - 98%)  I&O's Detail    15 Clifton 2018 07:01  -  16 Jun 2018 07:00  --------------------------------------------------------  IN:    lactated ringers.: 100 mL  Total IN: 100 mL    OUT:    Voided: 850 mL  Total OUT: 850 mL    Total NET: -750 mL          General: NAD, resting comfortably in bed  C/V: NSR  Pulm: Nonlabored breathing, no respiratory distress  Abd: soft,mildly distended, non tender, incision C/D/I, +bowel sounds  Extrem: WWP, no edema, SCDs in place        LABS:                        12.1   6.4   )-----------( 120      ( 16 Jun 2018 05:59 )             36.5     06-16    139  |  103  |  2<L>  ----------------------------<  121<H>  3.1<L>   |  25  |  0.53    Ca    8.6      16 Jun 2018 05:58  Phos  3.0     06-16  Mg     1.7     06-16            RADIOLOGY & ADDITIONAL STUDIES:

## 2018-06-16 NOTE — PROVIDER CONTACT NOTE (OTHER) - ASSESSMENT
Pt refused heparin injection. Instructed pt that it's a blood thinner and prevents blood clots. Pt responded with understanding but still refused. Pt stated "doctor said I'm starting aspirin tomorrow".

## 2018-06-16 NOTE — PROVIDER CONTACT NOTE (OTHER) - ASSESSMENT
Cardiac monitor shows that pt converted from NSR to Atrial fibrillation. Pt denied chest pain or sob. Asked if pt has history of atrial fibrillation and she replied "yes" but doesn't take any medication for afib.

## 2018-06-16 NOTE — PROGRESS NOTE ADULT - SUBJECTIVE AND OBJECTIVE BOX
Daily     Daily   Vital Signs Last 24 Hrs  T(C): 36.9 (16 Jun 2018 16:23), Max: 37.3 (16 Jun 2018 05:11)  T(F): 98.5 (16 Jun 2018 16:23), Max: 99.2 (16 Jun 2018 05:11)  HR: 83 (16 Jun 2018 16:23) (74 - 115)  BP: 100/66 (16 Jun 2018 16:23) (100/66 - 164/79)  BP(mean): 95 (15 Clifton 2018 17:45) (95 - 95)  RR: 16 (16 Jun 2018 16:23) (16 - 18)  SpO2: 98% (16 Jun 2018 16:23) (93% - 98%)  Allergies    latex (Flushing; Rash)  No Known Drug Allergies    Intolerances      Labs:                        12.1   6.4   )-----------( 120      ( 16 Jun 2018 05:59 )             36.5       06-16    139  |  103  |  2<L>  ----------------------------<  121<H>  3.1<L>   |  25  |  0.53    Ca    8.6      16 Jun 2018 05:58  Phos  3.0     06-16  Mg     1.7     06-16      I&O's Summary    15 Clifton 2018 07:01  -  16 Jun 2018 07:00  --------------------------------------------------------  IN: 100 mL / OUT: 950 mL / NET: -850 mL    16 Jun 2018 07:01  -  16 Jun 2018 16:42  --------------------------------------------------------  IN: 0 mL / OUT: 700 mL / NET: -700 mL

## 2018-06-16 NOTE — PROVIDER CONTACT NOTE (OTHER) - ASSESSMENT
Pt had about 3 liquid BMs last night and had a few this morning. HALIMA Van informed Dr. Rolle. However, dulcolax 5mg po ordered for bedtime. Asked if medication can be held.

## 2018-06-17 LAB
ANION GAP SERPL CALC-SCNC: 10 MMOL/L — SIGNIFICANT CHANGE UP (ref 5–17)
BUN SERPL-MCNC: 2 MG/DL — LOW (ref 7–23)
CALCIUM SERPL-MCNC: 8.7 MG/DL — SIGNIFICANT CHANGE UP (ref 8.4–10.5)
CHLORIDE SERPL-SCNC: 99 MMOL/L — SIGNIFICANT CHANGE UP (ref 96–108)
CO2 SERPL-SCNC: 27 MMOL/L — SIGNIFICANT CHANGE UP (ref 22–31)
CREAT SERPL-MCNC: 0.63 MG/DL — SIGNIFICANT CHANGE UP (ref 0.5–1.3)
GLUCOSE SERPL-MCNC: 103 MG/DL — HIGH (ref 70–99)
HCT VFR BLD CALC: 36.2 % — SIGNIFICANT CHANGE UP (ref 34.5–45)
HGB BLD-MCNC: 11.8 G/DL — SIGNIFICANT CHANGE UP (ref 11.5–15.5)
MAGNESIUM SERPL-MCNC: 2 MG/DL — SIGNIFICANT CHANGE UP (ref 1.6–2.6)
MCHC RBC-ENTMCNC: 32.6 G/DL — SIGNIFICANT CHANGE UP (ref 32–36)
MCHC RBC-ENTMCNC: 33.9 PG — SIGNIFICANT CHANGE UP (ref 27–34)
MCV RBC AUTO: 104 FL — HIGH (ref 80–100)
PHOSPHATE SERPL-MCNC: 2.6 MG/DL — SIGNIFICANT CHANGE UP (ref 2.5–4.5)
PLATELET # BLD AUTO: 111 K/UL — LOW (ref 150–400)
POTASSIUM SERPL-MCNC: 4 MMOL/L — SIGNIFICANT CHANGE UP (ref 3.5–5.3)
POTASSIUM SERPL-SCNC: 4 MMOL/L — SIGNIFICANT CHANGE UP (ref 3.5–5.3)
RBC # BLD: 3.48 M/UL — LOW (ref 3.8–5.2)
RBC # FLD: 13.1 % — SIGNIFICANT CHANGE UP (ref 10.3–16.9)
SODIUM SERPL-SCNC: 136 MMOL/L — SIGNIFICANT CHANGE UP (ref 135–145)
WBC # BLD: 4.8 K/UL — SIGNIFICANT CHANGE UP (ref 3.8–10.5)
WBC # FLD AUTO: 4.8 K/UL — SIGNIFICANT CHANGE UP (ref 3.8–10.5)

## 2018-06-17 RX ORDER — POTASSIUM PHOSPHATE, MONOBASIC POTASSIUM PHOSPHATE, DIBASIC 236; 224 MG/ML; MG/ML
15 INJECTION, SOLUTION INTRAVENOUS ONCE
Qty: 0 | Refills: 0 | Status: COMPLETED | OUTPATIENT
Start: 2018-06-17 | End: 2018-06-17

## 2018-06-17 RX ADMIN — Medication 1 TABLET(S): at 10:25

## 2018-06-17 RX ADMIN — Medication 10 MILLIGRAM(S): at 10:29

## 2018-06-17 RX ADMIN — OXYCODONE HYDROCHLORIDE 10 MILLIGRAM(S): 5 TABLET ORAL at 21:30

## 2018-06-17 RX ADMIN — PANTOPRAZOLE SODIUM 40 MILLIGRAM(S): 20 TABLET, DELAYED RELEASE ORAL at 05:09

## 2018-06-17 RX ADMIN — Medication 100 MILLIGRAM(S): at 10:27

## 2018-06-17 RX ADMIN — OXYCODONE HYDROCHLORIDE 5 MILLIGRAM(S): 5 TABLET ORAL at 16:50

## 2018-06-17 RX ADMIN — Medication 10 MILLIGRAM(S): at 20:47

## 2018-06-17 RX ADMIN — OXYCODONE HYDROCHLORIDE 5 MILLIGRAM(S): 5 TABLET ORAL at 12:16

## 2018-06-17 RX ADMIN — OXYCODONE HYDROCHLORIDE 10 MILLIGRAM(S): 5 TABLET ORAL at 00:30

## 2018-06-17 RX ADMIN — Medication 1 APPLICATION(S): at 11:16

## 2018-06-17 RX ADMIN — Medication 5 MILLIGRAM(S): at 20:47

## 2018-06-17 RX ADMIN — Medication 1 APPLICATION(S): at 05:09

## 2018-06-17 RX ADMIN — CARVEDILOL PHOSPHATE 3.12 MILLIGRAM(S): 80 CAPSULE, EXTENDED RELEASE ORAL at 05:09

## 2018-06-17 RX ADMIN — CARVEDILOL PHOSPHATE 3.12 MILLIGRAM(S): 80 CAPSULE, EXTENDED RELEASE ORAL at 17:51

## 2018-06-17 RX ADMIN — Medication 1 MILLIGRAM(S): at 10:28

## 2018-06-17 RX ADMIN — OXYCODONE HYDROCHLORIDE 5 MILLIGRAM(S): 5 TABLET ORAL at 15:50

## 2018-06-17 RX ADMIN — OXYCODONE HYDROCHLORIDE 10 MILLIGRAM(S): 5 TABLET ORAL at 20:47

## 2018-06-17 RX ADMIN — Medication 0.3 MILLIGRAM(S): at 10:30

## 2018-06-17 RX ADMIN — UMECLIDINIUM BROMIDE AND VILANTEROL TRIFENATATE 2 PUFF(S): 62.5; 25 POWDER RESPIRATORY (INHALATION) at 10:31

## 2018-06-17 RX ADMIN — MEDROXYPROGESTERONE ACETATE 2.5 MILLIGRAM(S): 150 INJECTION, SUSPENSION, EXTENDED RELEASE INTRAMUSCULAR at 10:27

## 2018-06-17 RX ADMIN — Medication 1 APPLICATION(S): at 15:49

## 2018-06-17 RX ADMIN — POTASSIUM PHOSPHATE, MONOBASIC POTASSIUM PHOSPHATE, DIBASIC 62.5 MILLIMOLE(S): 236; 224 INJECTION, SOLUTION INTRAVENOUS at 15:50

## 2018-06-17 RX ADMIN — ATORVASTATIN CALCIUM 10 MILLIGRAM(S): 80 TABLET, FILM COATED ORAL at 20:47

## 2018-06-17 RX ADMIN — Medication 1 APPLICATION(S): at 20:47

## 2018-06-17 RX ADMIN — Medication 81 MILLIGRAM(S): at 10:28

## 2018-06-17 RX ADMIN — OXYCODONE HYDROCHLORIDE 5 MILLIGRAM(S): 5 TABLET ORAL at 11:16

## 2018-06-17 NOTE — PROGRESS NOTE ADULT - SUBJECTIVE AND OBJECTIVE BOX
ON : Repeat BMP 3.3, another 40mEq K powder given, +multiple BM  6/16 : eye feels better, not passing flatus but minimal BM. Tolerating CLD, SQH dc-ed,  K+ 3.1, repleated, advance to FLD, strated estrogen therapy and home meds SUBJECTIVE: No acute events overnight. Multiple BMs. Tolerating diet, Advance to soft diet with glucerna shakes. Vaginal bleeding ovn. Now with hormone medication restarted.       Vital Signs Last 24 Hrs  T(C): 36.6 (17 Jun 2018 09:03), Max: 36.9 (16 Jun 2018 16:23)  T(F): 97.8 (17 Jun 2018 09:03), Max: 98.5 (16 Jun 2018 16:23)  HR: 84 (17 Jun 2018 09:03) (68 - 84)  BP: 110/74 (17 Jun 2018 09:03) (100/66 - 153/70)  BP(mean): 86 (17 Jun 2018 09:03) (86 - 86)  RR: 116 (17 Jun 2018 09:03) (16 - 116)  SpO2: 95% (17 Jun 2018 09:03) (93% - 98%)    General: NAD, resting comfortably in bed  Pulm: Nonlabored breathing, no respiratory distress  Abd: soft, NT, tympanic, improved distention, incisions cdi      LABS:                        11.8   4.8   )-----------( 111      ( 17 Jun 2018 06:59 )             36.2     06-17    136  |  99  |  2<L>  ----------------------------<  103<H>  4.0   |  27  |  0.63    Ca    8.7      17 Jun 2018 06:59  Phos  2.6     06-17  Mg     2.0     06-17

## 2018-06-17 NOTE — PROVIDER CONTACT NOTE (OTHER) - ASSESSMENT
Pt is ordered full liquid bariatric phase 2 pureed/soft diet. However pt just received a full liquid tray. Pt stated that Dr. Rolle said she will have a soft diet today.

## 2018-06-17 NOTE — PROGRESS NOTE ADULT - ASSESSMENT
6/17/18 2150Hr  afebrile.tolerating soft diet, less bm, passing flatus. abdomen less distended,tender only in area of splenic flexure dissecton and access at \[facility]\"" site. Bowel sound active.Ambulating. Heparin discontinued, ASA started. Vaginal bleeding which has been experienced before when prempro was stoppped is mild. Gyn Rx this and manitaied it for 40 years.   Patient encouraged to go home tomorrow,Own environment, food, shower,dogs,home assistance.Labs normal. ADH

## 2018-06-17 NOTE — PROGRESS NOTE ADULT - SUBJECTIVE AND OBJECTIVE BOX
Daily     Daily   Vital Signs Last 24 Hrs  T(C): 35.9 (17 Jun 2018 20:41), Max: 37 (17 Jun 2018 18:22)  T(F): 96.6 (17 Jun 2018 20:41), Max: 98.6 (17 Jun 2018 18:22)  HR: 76 (17 Jun 2018 20:41) (68 - 86)  BP: 128/60 (17 Jun 2018 20:41) (110/74 - 153/70)  BP(mean): 86 (17 Jun 2018 09:03) (86 - 86)  RR: 16 (17 Jun 2018 20:41) (15 - 16)  SpO2: 94% (17 Jun 2018 20:41) (94% - 97%)  Allergies    latex (Flushing; Rash)  No Known Drug Allergies    Intolerances      Labs:                        11.8   4.8   )-----------( 111      ( 17 Jun 2018 06:59 )             36.2       06-17    136  |  99  |  2<L>  ----------------------------<  103<H>  4.0   |  27  |  0.63    Ca    8.7      17 Jun 2018 06:59  Phos  2.6     06-17  Mg     2.0     06-17      I&O's Summary    16 Jun 2018 07:01  -  17 Jun 2018 07:00  --------------------------------------------------------  IN: 0 mL / OUT: 1300 mL / NET: -1300 mL    17 Jun 2018 07:01  -  17 Jun 2018 21:48  --------------------------------------------------------  IN: 250 mL / OUT: 0 mL / NET: 250 mL

## 2018-06-17 NOTE — PROGRESS NOTE ADULT - ASSESSMENT
72F extensive surgical hx with chronic partial obstruction of colon at splenic flexure s/p diagnostic laparoscopy, lysis of adhesions, mobilization of splenic flexure    Pain and nausea control with Dilaudid   FLD  Keep duonebs PRN (for COPD)  DVT prophylaxis (SCD and SQH)  AM labs   Folic acid and thiamine daily  cont home meds 72F extensive surgical hx with chronic partial obstruction of colon at splenic flexure s/p diagnostic laparoscopy, lysis of adhesions, mobilization of splenic flexure    Pain and nausea control with Dilaudid   Soft diet  Keep duonebs PRN (for COPD)  DVT prophylaxis (SCD and SQH)  AM labs   Folic acid and thiamine daily  cont home meds

## 2018-06-18 VITALS
TEMPERATURE: 96 F | OXYGEN SATURATION: 94 % | DIASTOLIC BLOOD PRESSURE: 63 MMHG | SYSTOLIC BLOOD PRESSURE: 135 MMHG | HEART RATE: 83 BPM | RESPIRATION RATE: 15 BRPM

## 2018-06-18 LAB
ANION GAP SERPL CALC-SCNC: 12 MMOL/L — SIGNIFICANT CHANGE UP (ref 5–17)
BUN SERPL-MCNC: 6 MG/DL — LOW (ref 7–23)
CALCIUM SERPL-MCNC: 9 MG/DL — SIGNIFICANT CHANGE UP (ref 8.4–10.5)
CHLORIDE SERPL-SCNC: 100 MMOL/L — SIGNIFICANT CHANGE UP (ref 96–108)
CO2 SERPL-SCNC: 25 MMOL/L — SIGNIFICANT CHANGE UP (ref 22–31)
CREAT SERPL-MCNC: 0.55 MG/DL — SIGNIFICANT CHANGE UP (ref 0.5–1.3)
GLUCOSE SERPL-MCNC: 106 MG/DL — HIGH (ref 70–99)
HCT VFR BLD CALC: 38.4 % — SIGNIFICANT CHANGE UP (ref 34.5–45)
HGB BLD-MCNC: 12.5 G/DL — SIGNIFICANT CHANGE UP (ref 11.5–15.5)
MAGNESIUM SERPL-MCNC: 1.7 MG/DL — SIGNIFICANT CHANGE UP (ref 1.6–2.6)
MCHC RBC-ENTMCNC: 32.6 G/DL — SIGNIFICANT CHANGE UP (ref 32–36)
MCHC RBC-ENTMCNC: 33.5 PG — SIGNIFICANT CHANGE UP (ref 27–34)
MCV RBC AUTO: 102.9 FL — HIGH (ref 80–100)
PHOSPHATE SERPL-MCNC: 3.8 MG/DL — SIGNIFICANT CHANGE UP (ref 2.5–4.5)
PLATELET # BLD AUTO: 137 K/UL — LOW (ref 150–400)
POTASSIUM SERPL-MCNC: 3.8 MMOL/L — SIGNIFICANT CHANGE UP (ref 3.5–5.3)
POTASSIUM SERPL-SCNC: 3.8 MMOL/L — SIGNIFICANT CHANGE UP (ref 3.5–5.3)
RBC # BLD: 3.73 M/UL — LOW (ref 3.8–5.2)
RBC # FLD: 13.1 % — SIGNIFICANT CHANGE UP (ref 10.3–16.9)
SODIUM SERPL-SCNC: 137 MMOL/L — SIGNIFICANT CHANGE UP (ref 135–145)
WBC # BLD: 5.7 K/UL — SIGNIFICANT CHANGE UP (ref 3.8–10.5)
WBC # FLD AUTO: 5.7 K/UL — SIGNIFICANT CHANGE UP (ref 3.8–10.5)

## 2018-06-18 PROCEDURE — 36415 COLL VENOUS BLD VENIPUNCTURE: CPT

## 2018-06-18 PROCEDURE — 85610 PROTHROMBIN TIME: CPT

## 2018-06-18 PROCEDURE — 80048 BASIC METABOLIC PNL TOTAL CA: CPT

## 2018-06-18 PROCEDURE — 86900 BLOOD TYPING SEROLOGIC ABO: CPT

## 2018-06-18 PROCEDURE — 85027 COMPLETE CBC AUTOMATED: CPT

## 2018-06-18 PROCEDURE — 86850 RBC ANTIBODY SCREEN: CPT

## 2018-06-18 PROCEDURE — 84100 ASSAY OF PHOSPHORUS: CPT

## 2018-06-18 PROCEDURE — 85025 COMPLETE CBC W/AUTO DIFF WBC: CPT

## 2018-06-18 PROCEDURE — 82962 GLUCOSE BLOOD TEST: CPT

## 2018-06-18 PROCEDURE — C9399: CPT

## 2018-06-18 PROCEDURE — 94640 AIRWAY INHALATION TREATMENT: CPT

## 2018-06-18 PROCEDURE — 83735 ASSAY OF MAGNESIUM: CPT

## 2018-06-18 PROCEDURE — 86901 BLOOD TYPING SEROLOGIC RH(D): CPT

## 2018-06-18 PROCEDURE — 74019 RADEX ABDOMEN 2 VIEWS: CPT

## 2018-06-18 PROCEDURE — 85730 THROMBOPLASTIN TIME PARTIAL: CPT

## 2018-06-18 RX ORDER — MULTIVIT-MIN/FERROUS GLUCONATE 9 MG/15 ML
0 LIQUID (ML) ORAL
Qty: 0 | Refills: 0 | COMMUNITY

## 2018-06-18 RX ORDER — DOCUSATE SODIUM 100 MG
1 CAPSULE ORAL
Qty: 30 | Refills: 0 | OUTPATIENT
Start: 2018-06-18

## 2018-06-18 RX ADMIN — Medication 0.3 MILLIGRAM(S): at 13:24

## 2018-06-18 RX ADMIN — PANTOPRAZOLE SODIUM 40 MILLIGRAM(S): 20 TABLET, DELAYED RELEASE ORAL at 05:36

## 2018-06-18 RX ADMIN — OXYCODONE HYDROCHLORIDE 10 MILLIGRAM(S): 5 TABLET ORAL at 05:51

## 2018-06-18 RX ADMIN — UMECLIDINIUM BROMIDE AND VILANTEROL TRIFENATATE 2 PUFF(S): 62.5; 25 POWDER RESPIRATORY (INHALATION) at 11:35

## 2018-06-18 RX ADMIN — OXYCODONE HYDROCHLORIDE 10 MILLIGRAM(S): 5 TABLET ORAL at 11:33

## 2018-06-18 RX ADMIN — Medication 1 APPLICATION(S): at 11:32

## 2018-06-18 RX ADMIN — Medication 81 MILLIGRAM(S): at 11:32

## 2018-06-18 RX ADMIN — Medication 1 MILLIGRAM(S): at 11:32

## 2018-06-18 RX ADMIN — CARVEDILOL PHOSPHATE 3.12 MILLIGRAM(S): 80 CAPSULE, EXTENDED RELEASE ORAL at 05:36

## 2018-06-18 RX ADMIN — MEDROXYPROGESTERONE ACETATE 2.5 MILLIGRAM(S): 150 INJECTION, SUSPENSION, EXTENDED RELEASE INTRAMUSCULAR at 11:32

## 2018-06-18 RX ADMIN — Medication 100 MILLIGRAM(S): at 11:33

## 2018-06-18 RX ADMIN — Medication 1 APPLICATION(S): at 05:36

## 2018-06-18 RX ADMIN — Medication 1 TABLET(S): at 11:32

## 2018-06-18 RX ADMIN — OXYCODONE HYDROCHLORIDE 10 MILLIGRAM(S): 5 TABLET ORAL at 06:50

## 2018-06-18 NOTE — DISCHARGE NOTE ADULT - PLAN OF CARE
Recovery Please resume all regular home medications unless specifically advised not to take a particular medication. Also, please take any new medications as prescribed, percocet for pain and colace for constipation.   Please get plenty of rest, continue to ambulate several times per day, and drink adequate amounts of fluids. Avoid lifting weights greater than 5-10 lbs until you follow-up with your surgeon, who will instruct you further regarding activity restrictions.  Avoid driving or operating heavy machinery while taking pain medications.  Please follow-up with your surgeon, Dr Rolle as instructed (1-2 weeks) and Primary Care Provider (PCP) as advised, also please follow up your gynecologist for your vaginal bleeding.  Incision Care:  *Please call your doctor or nurse practitioner if you have increased pain, swelling, redness, or drainage from the incision site.  *Avoid swimming and baths until your follow-up appointment.  *You may shower, and wash surgical incisions with a mild soap and warm water. Gently pat the area dry.  *If you have staples, they will be removed at your follow-up appointment.  *If you have steri-strips, they will fall off on their own. Please remove any remaining strips 7-10 days after surgery. Warning signs WARNING SIGNS:  Please call your doctor or nurse practitioner if you experience the following:  *You experience new chest pain, pressure, squeezing or tightness.  *New or worsening cough, shortness of breath, or wheeze.  *If you are vomiting and cannot keep down fluids or your medications.  *You are getting dehydrated due to continued vomiting, diarrhea, or other reasons. Signs of dehydration include dry mouth, rapid heartbeat, or feeling dizzy or faint when standing.  *You see blood or dark/black material when you vomit or have a bowel movement.  *You experience burning when you urinate, have blood in your urine, or experience a discharge.  *Your pain is not improving within 8-12 hours or is not gone within 24 hours. Call or return immediately if your pain is getting worse, changes location, or moves to your chest or back.  *You have shaking chills, or fever greater than 101.5 degrees Fahrenheit or 38 degrees Celsius.  *Any change in your symptoms, or any new symptoms that concern you.

## 2018-06-18 NOTE — DISCHARGE NOTE ADULT - CARE PLAN
Principal Discharge DX:	Colon obstruction  Goal:	Recovery  Assessment and plan of treatment:	Please resume all regular home medications unless specifically advised not to take a particular medication. Also, please take any new medications as prescribed, percocet for pain and colace for constipation.   Please get plenty of rest, continue to ambulate several times per day, and drink adequate amounts of fluids. Avoid lifting weights greater than 5-10 lbs until you follow-up with your surgeon, who will instruct you further regarding activity restrictions.  Avoid driving or operating heavy machinery while taking pain medications.  Please follow-up with your surgeon, Dr Rolle as instructed (1-2 weeks) and Primary Care Provider (PCP) as advised, also please follow up your gynecologist for your vaginal bleeding.  Incision Care:  *Please call your doctor or nurse practitioner if you have increased pain, swelling, redness, or drainage from the incision site.  *Avoid swimming and baths until your follow-up appointment.  *You may shower, and wash surgical incisions with a mild soap and warm water. Gently pat the area dry.  *If you have staples, they will be removed at your follow-up appointment.  *If you have steri-strips, they will fall off on their own. Please remove any remaining strips 7-10 days after surgery.  Goal:	Warning signs  Assessment and plan of treatment:	WARNING SIGNS:  Please call your doctor or nurse practitioner if you experience the following:  *You experience new chest pain, pressure, squeezing or tightness.  *New or worsening cough, shortness of breath, or wheeze.  *If you are vomiting and cannot keep down fluids or your medications.  *You are getting dehydrated due to continued vomiting, diarrhea, or other reasons. Signs of dehydration include dry mouth, rapid heartbeat, or feeling dizzy or faint when standing.  *You see blood or dark/black material when you vomit or have a bowel movement.  *You experience burning when you urinate, have blood in your urine, or experience a discharge.  *Your pain is not improving within 8-12 hours or is not gone within 24 hours. Call or return immediately if your pain is getting worse, changes location, or moves to your chest or back.  *You have shaking chills, or fever greater than 101.5 degrees Fahrenheit or 38 degrees Celsius.  *Any change in your symptoms, or any new symptoms that concern you.

## 2018-06-18 NOTE — DISCHARGE NOTE ADULT - HOSPITAL COURSE
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 Post-operatively, patient had a difficulty NGT insertion and mastisol was accidentally went into her left eye. Opthalmologist was consulted and patient was given eye drops and erythromycin ointment. Eventually, her left eye's symptoms improved and she had return of bowel functions on post op day 2. Diet was advanced slowly and as tolerated. She also had episodes of vaginal bleeding due to discontinuity of her hormone meds. Symptoms improved when it was restarted.   Upon discharge, patient was clinically stable, labs were within normal limits and vital signs were stable. Patient was discharged with pain medications and to follow up with  and her gynecologist in 1-2 weeks. 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 Kinked bowel trapped in scar behind spleen required mobilization of spleen.Post-operatively, patient had a difficulty NGT insertion and mastisol was accidentally went into her left eye. Opthalmologist was consulted and patient was given eye drops and erythromycin ointment. Eventually, her left eye's symptoms improved and she had return of bowel functions on post op day 2. Diet was advanced slowly and as tolerated. She also had episodes of vaginal bleeding due to discontinuity of her hormone meds. Symptoms improved when it was restarted.   Upon discharge, patient was clinically stable, labs were within normal limits and vital signs were stable. Patient was discharged with pain medications and to follow up with  and her gynecologist in 1-2 weeks. Patient is advised to try to discontinue PremPro under gyn supervion

## 2018-06-18 NOTE — DISCHARGE NOTE ADULT - MEDICATION SUMMARY - MEDICATIONS TO TAKE
I will START or STAY ON the medications listed below when I get home from the hospital:    flomag  -- 1 tab(s) by mouth 2 times a day  -- Indication: For Home meds     oxyCODONE 5 mg oral tablet  -- orally 2 times a day  -- Indication: For Home meds    Aspir 81 oral delayed release tablet  -- 1 tab(s) by mouth once a day  -- Indication: For HTN    Percocet 5/325 oral tablet  -- 1 tab(s) by mouth every 6 hours, As Needed -for severe pain MDD:4   -- Caution federal law prohibits the transfer of this drug to any person other  than the person for whom it was prescribed.  May cause drowsiness.  Alcohol may intensify this effect.  Use care when operating dangerous machinery.  This prescription cannot be refilled.  This product contains acetaminophen.  Do not use  with any other product containing acetaminophen to prevent possible liver damage.  Using more of this medication than prescribed may cause serious breathing problems.    -- Indication: For Post op pain    atorvastatin 10 mg oral tablet  -- 1 tab(s) by mouth once a day  -- Indication: For HLD    Coreg 3.125 mg oral tablet  -- 1 tab(s) by mouth 2 times a day  -- Indication: For HTN    Proventil 90 mcg/inh inhalation aerosol with adapter  -- Indication: For COPD (chronic obstructive pulmonary disease)    Anoro Ellipta 62.5 mcg-25 mcg/inh inhalation powder  -- 1 puff(s) inhaled once a day  -- Indication: For COPD (chronic obstructive pulmonary disease)    Colace 100 mg oral capsule  -- 1 cap(s) by mouth 2 times a day MDD:2  -- Medication should be taken with plenty of water.    -- Indication: For Constipation    baclofen 10 mg oral tablet  -- orally once a day (at bedtime)  -- Indication: For pain meds    Restasis 0.05% ophthalmic emulsion  -- to each affected eye 2 times a day  -- Indication: For Eye drops    Align 4 mg oral capsule  -- orally 2 times a day  -- Indication: For Home meds     PriLOSEC 20 mg oral delayed release capsule  -- 1 cap(s) by mouth once a day  -- Indication: For GERD    Prempro 0.3 mg-1.5 mg oral tablet  -- 1 tab(s) by mouth once a day (at bedtime)  -- Indication: For Vaginal bleeding

## 2018-06-18 NOTE — PROGRESS NOTE ADULT - SUBJECTIVE AND OBJECTIVE BOX
ovn: +flatus, tolerating diet w/o n/v. VSS, DOROTA.  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 VSS, DOR[place].  6/17: tolerating soft diet, OOB, +flatus      SUBJECTIVE: denies any complaints, tolerating diet, admits to flatus and BM, denies N/V. Pain well controlled      MEDICATIONS  (STANDING):  aspirin enteric coated 81 milliGRAM(s) Oral daily  atorvastatin 10 milliGRAM(s) Oral at bedtime  baclofen 10 milliGRAM(s) Oral at bedtime  bisacodyl 5 milliGRAM(s) Oral at bedtime  BUpivacaine liposome 1.3% Injectable (no eMAR) 20 milliLiter(s) Local Injection once  carvedilol 3.125 milliGRAM(s) Oral every 12 hours  erythromycin   Ointment 1 Application(s) Left EYE four times a day  estrogens    conjugated 0.3 milliGRAM(s) Oral daily  folic acid 1 milliGRAM(s) Oral daily  medroxyPROGESTERone 2.5 milliGRAM(s) Oral daily  multivitamin/minerals 1 Tablet(s) Oral daily  pantoprazole    Tablet 40 milliGRAM(s) Oral before breakfast  thiamine 100 milliGRAM(s) Oral daily  umeclidinium 62.5 MICROgram(s)/vilanterol 25 MICROgram(s) Inhaler 2 Puff(s) Inhalation daily    MEDICATIONS  (PRN):  ALBUTerol/ipratropium for Nebulization 3 milliLiter(s) Nebulizer every 6 hours PRN Shortness of Breath and/or Wheezing  artificial  tears Solution 1 Drop(s) Both EYES every 2 hours PRN eye pain  ondansetron Injectable 4 milliGRAM(s) IV Push every 6 hours PRN Nausea  oxyCODONE    IR 5 milliGRAM(s) Oral every 4 hours PRN Moderate Pain (4 - 6)  oxyCODONE    IR 10 milliGRAM(s) Oral every 4 hours PRN Severe Pain (7 - 10)      Vital Signs Last 24 Hrs  T(C): 36.6 (18 Jun 2018 04:45), Max: 37 (17 Jun 2018 18:22)  T(F): 97.9 (18 Jun 2018 04:45), Max: 98.6 (17 Jun 2018 18:22)  HR: 73 (18 Jun 2018 04:45) (73 - 86)  BP: 132/61 (18 Jun 2018 04:45) (110/74 - 154/70)  BP(mean): 86 (17 Jun 2018 09:03) (86 - 86)  RR: 16 (18 Jun 2018 04:45) (15 - 16)  SpO2: 94% (18 Jun 2018 04:45) (94% - 97%)    PHYSICAL EXAM:      Constitutional: A&Ox3    Respiratory: non labored breathing, no respiratory distress    Cardiovascular: NSR, RRR    Gastrointestinal: soft ND,NT                 Incision: CDI    Genitourinary: voiding    Extremities: (-) edema                  I&O's Detail    17 Jun 2018 07:01  -  18 Jun 2018 07:00  --------------------------------------------------------  IN:    IV PiggyBack: 250 mL  Total IN: 250 mL    OUT:    Voided: 1800 mL  Total OUT: 1800 mL    Total NET: -1550 mL          LABS:                        12.5   5.7   )-----------( 137      ( 18 Jun 2018 06:45 )             38.4     06-18    137  |  100  |  6<L>  ----------------------------<  106<H>  3.8   |  25  |  0.55    Ca    9.0      18 Jun 2018 06:45  Phos  3.8     06-18  Mg     1.7     06-18            RADIOLOGY & ADDITIONAL STUDIES:

## 2018-06-18 NOTE — PROGRESS NOTE ADULT - ASSESSMENT
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
## 2018-06-18 NOTE — DISCHARGE NOTE ADULT - FINDINGS/TREATMENT
Entrappend colon in scar behind spleen released adhesions from previous laparotomy Obstructed proximal colon emptied into previously empty distal colon

## 2018-06-18 NOTE — DISCHARGE NOTE ADULT - PATIENT PORTAL LINK FT
You can access the [facility] Patient Portal, offered by [facility], by registering with the following website: [email or web address]

## 2018-06-24 DIAGNOSIS — Z95.828 PRESENCE OF OTHER VASCULAR IMPLANTS AND GRAFTS: ICD-10-CM

## 2018-06-24 DIAGNOSIS — N93.9 ABNORMAL UTERINE AND VAGINAL BLEEDING, UNSPECIFIED: ICD-10-CM

## 2018-06-24 DIAGNOSIS — Z91.040 LATEX ALLERGY STATUS: ICD-10-CM

## 2018-06-24 DIAGNOSIS — Z98.1 ARTHRODESIS STATUS: ICD-10-CM

## 2018-06-24 DIAGNOSIS — J44.9 CHRONIC OBSTRUCTIVE PULMONARY DISEASE, UNSPECIFIED: ICD-10-CM

## 2018-06-24 DIAGNOSIS — R19.7 DIARRHEA, UNSPECIFIED: ICD-10-CM

## 2018-06-24 DIAGNOSIS — I42.9 CARDIOMYOPATHY, UNSPECIFIED: ICD-10-CM

## 2018-06-24 DIAGNOSIS — Z96.651 PRESENCE OF RIGHT ARTIFICIAL KNEE JOINT: ICD-10-CM

## 2018-06-24 DIAGNOSIS — Z86.718 PERSONAL HISTORY OF OTHER VENOUS THROMBOSIS AND EMBOLISM: ICD-10-CM

## 2018-06-24 DIAGNOSIS — M19.90 UNSPECIFIED OSTEOARTHRITIS, UNSPECIFIED SITE: ICD-10-CM

## 2018-06-24 DIAGNOSIS — H57.8 OTHER SPECIFIED DISORDERS OF EYE AND ADNEXA: ICD-10-CM

## 2018-06-24 DIAGNOSIS — I50.9 HEART FAILURE, UNSPECIFIED: ICD-10-CM

## 2018-06-24 DIAGNOSIS — Z87.891 PERSONAL HISTORY OF NICOTINE DEPENDENCE: ICD-10-CM

## 2018-06-24 DIAGNOSIS — K21.9 GASTRO-ESOPHAGEAL REFLUX DISEASE WITHOUT ESOPHAGITIS: ICD-10-CM

## 2018-06-24 DIAGNOSIS — K56.51 INTESTINAL ADHESIONS [BANDS], WITH PARTIAL OBSTRUCTION: ICD-10-CM

## 2018-12-12 PROBLEM — J44.9 CHRONIC OBSTRUCTIVE PULMONARY DISEASE, UNSPECIFIED: Chronic | Status: ACTIVE | Noted: 2018-06-14

## 2018-12-12 PROBLEM — I42.9 CARDIOMYOPATHY, UNSPECIFIED: Chronic | Status: ACTIVE | Noted: 2018-06-14

## 2018-12-12 PROBLEM — C44.91 BASAL CELL CARCINOMA OF SKIN, UNSPECIFIED: Chronic | Status: ACTIVE | Noted: 2018-06-13

## 2018-12-12 PROBLEM — K56.609 UNSPECIFIED INTESTINAL OBSTRUCTION, UNSPECIFIED AS TO PARTIAL VERSUS COMPLETE OBSTRUCTION: Chronic | Status: ACTIVE | Noted: 2018-06-13

## 2018-12-12 PROBLEM — I82.409 ACUTE EMBOLISM AND THROMBOSIS OF UNSPECIFIED DEEP VEINS OF UNSPECIFIED LOWER EXTREMITY: Chronic | Status: ACTIVE | Noted: 2018-06-14

## 2019-01-02 VITALS
TEMPERATURE: 96 F | OXYGEN SATURATION: 95 % | HEART RATE: 72 BPM | DIASTOLIC BLOOD PRESSURE: 58 MMHG | RESPIRATION RATE: 16 BRPM | WEIGHT: 115.08 LBS | HEIGHT: 65 IN | SYSTOLIC BLOOD PRESSURE: 112 MMHG

## 2019-01-02 RX ORDER — CYCLOSPORINE 0.5 MG/ML
0 EMULSION OPHTHALMIC
Qty: 0 | Refills: 0 | COMMUNITY

## 2019-01-02 RX ORDER — ALBUTEROL 90 UG/1
0 AEROSOL, METERED ORAL
Qty: 0 | Refills: 0 | COMMUNITY

## 2019-01-02 RX ORDER — ALUMINUM ZIRCONIUM TRICHLOROHYDREX GLY 0.2 G/G
0 STICK TOPICAL
Qty: 0 | Refills: 0 | COMMUNITY

## 2019-01-02 RX ORDER — UMECLIDINIUM BROMIDE AND VILANTEROL TRIFENATATE 62.5; 25 UG/1; UG/1
1 POWDER RESPIRATORY (INHALATION)
Qty: 0 | Refills: 0 | COMMUNITY

## 2019-01-02 RX ORDER — CARVEDILOL PHOSPHATE 80 MG/1
1 CAPSULE, EXTENDED RELEASE ORAL
Qty: 0 | Refills: 0 | COMMUNITY

## 2019-01-02 RX ORDER — ASPIRIN/CALCIUM CARB/MAGNESIUM 324 MG
1 TABLET ORAL
Qty: 0 | Refills: 0 | COMMUNITY

## 2019-01-02 RX ORDER — OMEPRAZOLE 10 MG/1
1 CAPSULE, DELAYED RELEASE ORAL
Qty: 0 | Refills: 0 | COMMUNITY

## 2019-01-02 RX ORDER — ATORVASTATIN CALCIUM 80 MG/1
1 TABLET, FILM COATED ORAL
Qty: 0 | Refills: 0 | COMMUNITY

## 2019-01-02 RX ORDER — BACLOFEN 100 %
0 POWDER (GRAM) MISCELLANEOUS
Qty: 0 | Refills: 0 | COMMUNITY

## 2019-01-02 RX ORDER — OXYCODONE HYDROCHLORIDE 5 MG/1
0 TABLET ORAL
Qty: 0 | Refills: 0 | COMMUNITY

## 2019-01-02 NOTE — ASU PATIENT PROFILE, ADULT - PSH
Elbow fracture  left  H/O laminectomy    History of appendectomy    History of lumbar fusion  and thoracic  History of lumbar fusion  failed  History of total right knee replacement  '13  Volvulus Elbow fracture  left  H/O laminectomy    History of appendectomy    History of lumbar fusion  and thoracic  History of lumbar fusion  failed  History of total right knee replacement  '13  Presence of IVC filter    Volvulus

## 2019-01-02 NOTE — PRE-OP CHECKLIST - INTERNAL PROSTHESES
hardware in Knee, titanium rods in spine/yes(specify) yes(specify)/hardware in Knee, titanium rods in spine, ivc filter

## 2019-01-03 ENCOUNTER — OUTPATIENT (OUTPATIENT)
Dept: OUTPATIENT SERVICES | Facility: HOSPITAL | Age: 74
LOS: 1 days | Discharge: HOME | End: 2019-01-03
Payer: MEDICARE

## 2019-01-03 VITALS
RESPIRATION RATE: 16 BRPM | DIASTOLIC BLOOD PRESSURE: 60 MMHG | OXYGEN SATURATION: 95 % | SYSTOLIC BLOOD PRESSURE: 115 MMHG | HEART RATE: 72 BPM

## 2019-01-03 DIAGNOSIS — K56.2 VOLVULUS: Chronic | ICD-10-CM

## 2019-01-03 DIAGNOSIS — Z95.828 PRESENCE OF OTHER VASCULAR IMPLANTS AND GRAFTS: Chronic | ICD-10-CM

## 2019-01-03 DIAGNOSIS — Z90.49 ACQUIRED ABSENCE OF OTHER SPECIFIED PARTS OF DIGESTIVE TRACT: Chronic | ICD-10-CM

## 2019-01-03 DIAGNOSIS — Z98.1 ARTHRODESIS STATUS: Chronic | ICD-10-CM

## 2019-01-03 DIAGNOSIS — Z96.651 PRESENCE OF RIGHT ARTIFICIAL KNEE JOINT: Chronic | ICD-10-CM

## 2019-01-03 DIAGNOSIS — S42.409A UNSPECIFIED FRACTURE OF LOWER END OF UNSPECIFIED HUMERUS, INITIAL ENCOUNTER FOR CLOSED FRACTURE: Chronic | ICD-10-CM

## 2019-01-03 DIAGNOSIS — Z98.890 OTHER SPECIFIED POSTPROCEDURAL STATES: Chronic | ICD-10-CM

## 2019-01-03 PROCEDURE — 49568: CPT

## 2019-01-03 PROCEDURE — 49561: CPT

## 2019-01-03 PROCEDURE — C1781: CPT

## 2019-01-03 PROCEDURE — 88302 TISSUE EXAM BY PATHOLOGIST: CPT

## 2019-01-03 RX ORDER — BUPIVACAINE 13.3 MG/ML
20 INJECTION, SUSPENSION, LIPOSOMAL INFILTRATION ONCE
Qty: 0 | Refills: 0 | Status: DISCONTINUED | OUTPATIENT
Start: 2019-01-03 | End: 2019-01-03

## 2019-01-03 RX ORDER — HYDROMORPHONE HYDROCHLORIDE 2 MG/ML
0.5 INJECTION INTRAMUSCULAR; INTRAVENOUS; SUBCUTANEOUS ONCE
Qty: 0 | Refills: 0 | Status: DISCONTINUED | OUTPATIENT
Start: 2019-01-03 | End: 2019-01-03

## 2019-01-03 RX ORDER — BENZOCAINE AND MENTHOL 5; 1 G/100ML; G/100ML
1 LIQUID ORAL ONCE
Qty: 0 | Refills: 0 | Status: COMPLETED | OUTPATIENT
Start: 2019-01-03 | End: 2019-01-03

## 2019-01-03 RX ORDER — ONDANSETRON 8 MG/1
4 TABLET, FILM COATED ORAL EVERY 6 HOURS
Qty: 0 | Refills: 0 | Status: DISCONTINUED | OUTPATIENT
Start: 2019-01-03 | End: 2019-01-03

## 2019-01-03 RX ORDER — SODIUM CHLORIDE 9 MG/ML
1000 INJECTION, SOLUTION INTRAVENOUS
Qty: 0 | Refills: 0 | Status: DISCONTINUED | OUTPATIENT
Start: 2019-01-03 | End: 2019-01-03

## 2019-01-03 RX ORDER — OXYCODONE HYDROCHLORIDE 5 MG/1
10 TABLET ORAL EVERY 4 HOURS
Qty: 0 | Refills: 0 | Status: DISCONTINUED | OUTPATIENT
Start: 2019-01-03 | End: 2019-01-03

## 2019-01-03 RX ORDER — OXYCODONE HYDROCHLORIDE 5 MG/1
5 TABLET ORAL EVERY 4 HOURS
Qty: 0 | Refills: 0 | Status: DISCONTINUED | OUTPATIENT
Start: 2019-01-03 | End: 2019-01-03

## 2019-01-03 RX ADMIN — BENZOCAINE AND MENTHOL 1 LOZENGE: 5; 1 LIQUID ORAL at 15:21

## 2019-01-03 RX ADMIN — OXYCODONE HYDROCHLORIDE 10 MILLIGRAM(S): 5 TABLET ORAL at 14:50

## 2019-01-03 RX ADMIN — SODIUM CHLORIDE 50 MILLILITER(S): 9 INJECTION, SOLUTION INTRAVENOUS at 13:15

## 2019-01-03 RX ADMIN — OXYCODONE HYDROCHLORIDE 10 MILLIGRAM(S): 5 TABLET ORAL at 15:25

## 2019-01-03 NOTE — PACU DISCHARGE NOTE - COMMENTS
pt aao x3.  VSS>  abd binder in place.  denies need for pain meds or antiemetics.  tolerating PO.  reviewed discharge instructions with pt; pt verbalizes understanding.  copy given to pt.  ambulating with steady gait.  IV discontinued.  discharged to lobby via w/c with transport

## 2019-01-03 NOTE — BRIEF OPERATIVE NOTE - OPERATION/FINDINGS
Preop Dx: Left Lower Quadrant Ventral Hernia  Postop Dx: Incarcerated Left Lower Quadrant Hernia  Procedure: Open Repair of Left lower Quadrant ventral hernia with mesh  Findings: Ventral hernia with incarcerated omentum. Incarcerated portion excised. Medium Ventralex ST patch placed in preperitoneal space. External Oblique fascia closed above. Subcutaneous space closed with running 2-0 vicryl and Skin closed with 4-0 monocryl.

## 2019-01-03 NOTE — BRIEF OPERATIVE NOTE - PROCEDURE
<<-----Click on this checkbox to enter Procedure Ventral hernia repair with mesh  01/03/2019    Active  [id number]

## 2019-01-04 LAB — SURGICAL PATHOLOGY STUDY: SIGNIFICANT CHANGE UP

## 2020-01-21 NOTE — DISCHARGE NOTE ADULT - VISION (WITH CORRECTIVE LENSES IF THE PATIENT USUALLY WEARS THEM):
Pantoprazole (Protonix) 40 mg      Last Office Visit: 10/16/19  Future Office visit: none     Routing refill request to provider for review/approval because:  Medication is reported/historical  Brenda J. Goodell, RN on 1/21/2020 at 2:09 PM      
Normal vision: sees adequately in most situations; can see medication labels, newsprint

## 2020-12-15 NOTE — PRE-OP CHECKLIST - LATEX ALLERGY
PRELIMINARY REPORT/[facility]/EMERGENCY AFTER HOURS PROCEDURE:

 

 

EXAM: 

CT Head Without Intravenous Contrast. 

 

CLINICAL HISTORY: 

ER PATIENT; BROUGHT IN BY AMBULANCE DUE TO ALTERED MENTAL STATUS; LBP; DIZZINESS 

 

TECHNIQUE: 

Axial computed tomography images of the head/brain without intravenous contrast. 

 

COMPARISON: 

None provided. 

 

FINDINGS: 

 

BRAIN: 

No acute intraparenchymal hemorrhage. No mass lesion. No CT evidence for acute territorial infarct. N
o midline shift or extra-axial collection. 

 

VENTRICLES: 

No hydrocephalus. 

 

ORBITS: 

The orbits are unremarkable. 

 

SINUSES AND MASTOIDS: 

The paranasal sinuses and mastoid air cells are clear. 

 

SOFT TISSUES: 

No significant facial or scalp soft tissue swelling evident. No radiopaque foreign body is seen. 

 

BONES: 

No acute skull fracture. 

 

IMPRESSION: 

No acute intracranial abnormality.

 

 

ELECTRONICALLY SIGNED BY:

Brain Mattson MD

Dec 15, 2020 4:35:21 AM CST

 

This report is intended for review by the ordering physician only, in accordance of law. If you recei
ve this report in error, please call [facility] at [phone].

 

 

 

 

 

FINAL REPORT 

 

EMERGENT AFTER HOURS CT OF THE BRAIN:

 

COMPARISON: 

3/8/2019.

 

HISTORY: 

Altered mental status.

 

FINDINGS/IMPRESSION: 

I agree with the findings and impression given in the preliminary report per [facility] physici
an.  No evidence of acute intracranial abnormality.

 

POS: EAA
yes

## 2021-06-08 NOTE — DISCHARGE NOTE ADULT - REASON FOR ADMISSION
Instructions: This plan will send the code FBSE to the PM system.  DO NOT or CHANGE the price. Price (Do Not Change): 0.00 Detail Level: Simple Partial small bowel obstruction Incomplete Colon Obstruction